# Patient Record
Sex: FEMALE | URBAN - METROPOLITAN AREA
[De-identification: names, ages, dates, MRNs, and addresses within clinical notes are randomized per-mention and may not be internally consistent; named-entity substitution may affect disease eponyms.]

---

## 2019-03-24 ENCOUNTER — EMERGENCY (EMERGENCY)
Facility: HOSPITAL | Age: 69
LOS: 1 days | Discharge: HOME | End: 2019-03-24
Attending: EMERGENCY MEDICINE
Payer: MEDICARE

## 2019-03-24 VITALS
SYSTOLIC BLOOD PRESSURE: 151 MMHG | OXYGEN SATURATION: 98 % | WEIGHT: 175.05 LBS | TEMPERATURE: 98 F | RESPIRATION RATE: 20 BRPM | HEART RATE: 77 BPM | DIASTOLIC BLOOD PRESSURE: 78 MMHG | HEIGHT: 66 IN

## 2019-03-24 VITALS
OXYGEN SATURATION: 98 % | SYSTOLIC BLOOD PRESSURE: 152 MMHG | HEART RATE: 60 BPM | TEMPERATURE: 98 F | DIASTOLIC BLOOD PRESSURE: 84 MMHG | RESPIRATION RATE: 20 BRPM

## 2019-03-24 LAB
ALBUMIN SERPL ELPH-MCNC: 4.4 G/DL — SIGNIFICANT CHANGE UP (ref 3.3–5)
ALP SERPL-CCNC: 90 U/L — SIGNIFICANT CHANGE UP (ref 40–120)
ALT FLD-CCNC: 23 U/L — SIGNIFICANT CHANGE UP (ref 10–45)
ANION GAP SERPL CALC-SCNC: 13 MMOL/L — SIGNIFICANT CHANGE UP (ref 5–17)
AST SERPL-CCNC: 18 U/L — SIGNIFICANT CHANGE UP (ref 10–40)
BASOPHILS # BLD AUTO: 0.1 K/UL — SIGNIFICANT CHANGE UP (ref 0–0.2)
BASOPHILS NFR BLD AUTO: 0.8 % — SIGNIFICANT CHANGE UP (ref 0–2)
BILIRUB SERPL-MCNC: 0.6 MG/DL — SIGNIFICANT CHANGE UP (ref 0.2–1.2)
BUN SERPL-MCNC: 11 MG/DL — SIGNIFICANT CHANGE UP (ref 7–23)
CALCIUM SERPL-MCNC: 9.4 MG/DL — SIGNIFICANT CHANGE UP (ref 8.4–10.5)
CHLORIDE SERPL-SCNC: 104 MMOL/L — SIGNIFICANT CHANGE UP (ref 96–108)
CO2 SERPL-SCNC: 23 MMOL/L — SIGNIFICANT CHANGE UP (ref 22–31)
CREAT SERPL-MCNC: 0.84 MG/DL — SIGNIFICANT CHANGE UP (ref 0.5–1.3)
D DIMER BLD IA.RAPID-MCNC: 155 NG/ML DDU — SIGNIFICANT CHANGE UP
EOSINOPHIL # BLD AUTO: 0.1 K/UL — SIGNIFICANT CHANGE UP (ref 0–0.5)
EOSINOPHIL NFR BLD AUTO: 1.2 % — SIGNIFICANT CHANGE UP (ref 0–6)
GLUCOSE SERPL-MCNC: 114 MG/DL — HIGH (ref 70–99)
HCT VFR BLD CALC: 39.3 % — SIGNIFICANT CHANGE UP (ref 34.5–45)
HGB BLD-MCNC: 13.2 G/DL — SIGNIFICANT CHANGE UP (ref 11.5–15.5)
LYMPHOCYTES # BLD AUTO: 1.6 K/UL — SIGNIFICANT CHANGE UP (ref 1–3.3)
LYMPHOCYTES # BLD AUTO: 14.3 % — SIGNIFICANT CHANGE UP (ref 13–44)
MCHC RBC-ENTMCNC: 30.3 PG — SIGNIFICANT CHANGE UP (ref 27–34)
MCHC RBC-ENTMCNC: 33.5 GM/DL — SIGNIFICANT CHANGE UP (ref 32–36)
MCV RBC AUTO: 90.7 FL — SIGNIFICANT CHANGE UP (ref 80–100)
MONOCYTES # BLD AUTO: 0.8 K/UL — SIGNIFICANT CHANGE UP (ref 0–0.9)
MONOCYTES NFR BLD AUTO: 7.3 % — SIGNIFICANT CHANGE UP (ref 2–14)
NEUTROPHILS # BLD AUTO: 8.6 K/UL — HIGH (ref 1.8–7.4)
NEUTROPHILS NFR BLD AUTO: 76.3 % — SIGNIFICANT CHANGE UP (ref 43–77)
PLATELET # BLD AUTO: 246 K/UL — SIGNIFICANT CHANGE UP (ref 150–400)
POTASSIUM SERPL-MCNC: 4.1 MMOL/L — SIGNIFICANT CHANGE UP (ref 3.5–5.3)
POTASSIUM SERPL-SCNC: 4.1 MMOL/L — SIGNIFICANT CHANGE UP (ref 3.5–5.3)
PROT SERPL-MCNC: 7.3 G/DL — SIGNIFICANT CHANGE UP (ref 6–8.3)
RBC # BLD: 4.34 M/UL — SIGNIFICANT CHANGE UP (ref 3.8–5.2)
RBC # FLD: 12.6 % — SIGNIFICANT CHANGE UP (ref 10.3–14.5)
SODIUM SERPL-SCNC: 140 MMOL/L — SIGNIFICANT CHANGE UP (ref 135–145)
WBC # BLD: 11.3 K/UL — HIGH (ref 3.8–10.5)
WBC # FLD AUTO: 11.3 K/UL — HIGH (ref 3.8–10.5)

## 2019-03-24 PROCEDURE — 85027 COMPLETE CBC AUTOMATED: CPT

## 2019-03-24 PROCEDURE — 96374 THER/PROPH/DIAG INJ IV PUSH: CPT

## 2019-03-24 PROCEDURE — 99284 EMERGENCY DEPT VISIT MOD MDM: CPT | Mod: 25

## 2019-03-24 PROCEDURE — 85379 FIBRIN DEGRADATION QUANT: CPT

## 2019-03-24 PROCEDURE — 99284 EMERGENCY DEPT VISIT MOD MDM: CPT

## 2019-03-24 PROCEDURE — 71046 X-RAY EXAM CHEST 2 VIEWS: CPT | Mod: 26

## 2019-03-24 PROCEDURE — 80053 COMPREHEN METABOLIC PANEL: CPT

## 2019-03-24 PROCEDURE — 71046 X-RAY EXAM CHEST 2 VIEWS: CPT

## 2019-03-24 PROCEDURE — 93005 ELECTROCARDIOGRAM TRACING: CPT

## 2019-03-24 RX ORDER — LIDOCAINE 4 G/100G
1 CREAM TOPICAL ONCE
Refills: 0 | Status: COMPLETED | OUTPATIENT
Start: 2019-03-24 | End: 2019-03-24

## 2019-03-24 RX ORDER — DIAZEPAM 5 MG
2 TABLET ORAL ONCE
Refills: 0 | Status: DISCONTINUED | OUTPATIENT
Start: 2019-03-24 | End: 2019-03-24

## 2019-03-24 RX ORDER — DIAZEPAM 5 MG
1 TABLET ORAL
Qty: 6 | Refills: 0
Start: 2019-03-24 | End: 2019-03-25

## 2019-03-24 RX ORDER — ACETAMINOPHEN 500 MG
650 TABLET ORAL ONCE
Refills: 0 | Status: COMPLETED | OUTPATIENT
Start: 2019-03-24 | End: 2019-03-24

## 2019-03-24 RX ORDER — KETOROLAC TROMETHAMINE 30 MG/ML
15 SYRINGE (ML) INJECTION ONCE
Refills: 0 | Status: DISCONTINUED | OUTPATIENT
Start: 2019-03-24 | End: 2019-03-24

## 2019-03-24 RX ADMIN — LIDOCAINE 1 PATCH: 4 CREAM TOPICAL at 09:17

## 2019-03-24 RX ADMIN — Medication 650 MILLIGRAM(S): at 09:17

## 2019-03-24 RX ADMIN — Medication 15 MILLIGRAM(S): at 12:39

## 2019-03-24 RX ADMIN — Medication 650 MILLIGRAM(S): at 12:39

## 2019-03-24 RX ADMIN — Medication 2 MILLIGRAM(S): at 12:39

## 2019-03-24 NOTE — ED PROVIDER NOTE - NSFOLLOWUPINSTRUCTIONS_ED_ALL_ED_FT
You weer seen for back pain. THe screening test for blood clot was negative. This may be muscular pain. For this you should take motrin 600mg every 6 hours, and you can take valium 1 pill every 8 hours if you need for severe pain. No drinking alcohol or driving while on this medication. This also may be the beginning of shingles. If you see a rash that develops please see a provider for evalution. Return for worsening pain, difficulty breathing, sweating, chest pain, fevers.

## 2019-03-24 NOTE — ED ADULT NURSE NOTE - OBJECTIVE STATEMENT
67 yo F presents to ED A+Ox3 accompanied by  to ED, ambulatory with steady gait c/o L upper back pain. Pt. states the pain began on 3/22/19. Pt. states the pain is worse with movement. Pt. denies falls/trauma to area. Pt. reports SOB "because the pain is worse when I try and take a deep breath." Pt. denies chest pain, fever, chills, abdominal pain. Breathing unlabored on RA, speaking in full sentences without difficulty. Skin warm pink and dry. Family at bedside. Comfort and safety measures in place.

## 2019-03-24 NOTE — ED PROVIDER NOTE - CLINICAL SUMMARY MEDICAL DECISION MAKING FREE TEXT BOX
68 F no sig pmh, with L scpular pleuritic pain wrapping to the side, with possible overlying areas of erythema. Muscular vs ?zoster vs PE given pleuriy. Will check basic labs with d-dimer, cxr, ekg pain control and reassess 68 F no sig pmh, with L scpular pleuritic pain wrapping to the side, with possible overlying areas of erythema. Muscular vs ?zoster vs PE given pleuriy. Will check basic labs with d-dimer, cxr, ekg pain control and reassess  Pt with pain on laft mid back with radiation to front no trauma no fever Pain with breathing cough and moving left arm twisting trunk No known trauma No palpitation Very low risk for DVT/PE no distress no tachycardia SaO2 wnl Trunk no rash Lungs cleat Heart regular s1s2 no murmur Likely MSK pain also check Ddimer CXR  NSAIDs re eval --Lamb

## 2019-03-24 NOTE — ED PROVIDER NOTE - ATTENDING CONTRIBUTION TO CARE
I have seen and evaluated this patient with the resident.   I agree with the findings  unless other wise stated.  I have made appropriate changes in documentations where needed, After my face to face bedside evaluation, I am further  notin F no sig pmh, with L scpular pleuritic pain wrapping to the side, with possible overlying areas of erythema. Muscular vs ?zoster vs PE given pleuriy. Will check basic labs with d-dimer, cxr, ekg pain control and reassess  Pt with pain on laft mid back with radiation to front no trauma no fever Pain with breathing cough and moving left arm twisting trunk No known trauma No palpitation Very low risk for DVT/PE no distress no tachycardia SaO2 wnl Trunk no rash Lungs cleat Heart regular s1s2 no murmur Likely MSK pain also check Ddimer CXR  NSAIDs re eval --Lamb

## 2019-03-24 NOTE — ED PROVIDER NOTE - PROGRESS NOTE DETAILS
d-dimer neg. likely muscular vs ?zoster, will dc with pain meds and isntructions to see a provider if develops rash

## 2019-03-24 NOTE — ED PROVIDER NOTE - OBJECTIVE STATEMENT
68 F no sig pmh, pw L sided rib/back pain. Started 2 days ago at night. Has been taking motrin with little relief. Worse with movement and inspiration. No chest pain. No trauma. No rash. No fever/chills. No pain with urination/dysuria. No recent travel/smoking/leg pain or swelling/h/o blood clot.

## 2019-03-28 ENCOUNTER — APPOINTMENT (OUTPATIENT)
Dept: ORTHOPEDIC SURGERY | Facility: CLINIC | Age: 69
End: 2019-03-28
Payer: MEDICARE

## 2019-03-28 VITALS
SYSTOLIC BLOOD PRESSURE: 134 MMHG | WEIGHT: 172 LBS | BODY MASS INDEX: 27.76 KG/M2 | HEART RATE: 66 BPM | DIASTOLIC BLOOD PRESSURE: 77 MMHG

## 2019-03-28 VITALS — HEIGHT: 66 IN

## 2019-03-28 DIAGNOSIS — Z78.9 OTHER SPECIFIED HEALTH STATUS: ICD-10-CM

## 2019-03-28 DIAGNOSIS — Z87.891 PERSONAL HISTORY OF NICOTINE DEPENDENCE: ICD-10-CM

## 2019-03-28 DIAGNOSIS — M47.816 SPONDYLOSIS W/OUT MYELOPATHY OR RADICULOPATHY, LUMBAR REGION: ICD-10-CM

## 2019-03-28 DIAGNOSIS — M25.552 PAIN IN LEFT HIP: ICD-10-CM

## 2019-03-28 PROBLEM — Z00.00 ENCOUNTER FOR PREVENTIVE HEALTH EXAMINATION: Status: ACTIVE | Noted: 2019-03-28

## 2019-03-28 PROCEDURE — 99204 OFFICE O/P NEW MOD 45 MIN: CPT

## 2019-03-28 PROCEDURE — 72100 X-RAY EXAM L-S SPINE 2/3 VWS: CPT

## 2019-03-28 PROCEDURE — 73502 X-RAY EXAM HIP UNI 2-3 VIEWS: CPT | Mod: LT

## 2019-03-29 PROBLEM — Z78.9 DOES NOT USE ILLICIT DRUGS: Status: ACTIVE | Noted: 2019-03-28

## 2019-03-29 PROBLEM — Z78.9 NO PERTINENT PAST MEDICAL HISTORY: Status: RESOLVED | Noted: 2019-03-28 | Resolved: 2019-03-29

## 2019-03-29 PROBLEM — Z78.9 NO PERTINENT PAST SURGICAL HISTORY: Status: RESOLVED | Noted: 2019-03-28 | Resolved: 2019-03-29

## 2019-03-29 PROBLEM — Z78.9 EXERCISES OCCASIONALLY: Status: ACTIVE | Noted: 2019-03-28

## 2019-03-29 PROBLEM — Z87.891 FORMER SMOKER: Status: ACTIVE | Noted: 2019-03-28

## 2019-03-29 PROBLEM — M25.552 LEFT HIP PAIN: Status: ACTIVE | Noted: 2019-03-29

## 2019-03-29 RX ORDER — NEOMYCIN SULFATE, POLYMYXIN B SULFATE, HYDROCORTISONE 3.5; 10000; 1 MG/ML; [USP'U]/ML; MG/ML
1 SOLUTION/ DROPS AURICULAR (OTIC)
Qty: 10 | Refills: 0 | Status: ACTIVE | COMMUNITY
Start: 2018-12-26

## 2019-03-29 RX ORDER — AMOXICILLIN AND CLAVULANATE POTASSIUM 875; 125 MG/1; MG/1
875-125 TABLET, COATED ORAL
Qty: 20 | Refills: 0 | Status: ACTIVE | COMMUNITY
Start: 2019-01-09

## 2019-03-29 RX ORDER — FLUTICASONE PROPIONATE 50 UG/1
50 SPRAY, METERED NASAL
Qty: 16 | Refills: 0 | Status: ACTIVE | COMMUNITY
Start: 2019-01-09

## 2019-03-29 RX ORDER — TIZANIDINE 4 MG/1
4 TABLET ORAL
Qty: 90 | Refills: 0 | Status: ACTIVE | COMMUNITY
Start: 2018-02-06

## 2019-03-29 RX ORDER — PREDNISONE 10 MG/1
10 TABLET ORAL
Qty: 7 | Refills: 0 | Status: ACTIVE | COMMUNITY
Start: 2019-03-15

## 2019-03-29 RX ORDER — ESOMEPRAZOLE MAGNESIUM 40 MG/1
40 CAPSULE, DELAYED RELEASE ORAL
Qty: 90 | Refills: 0 | Status: ACTIVE | COMMUNITY
Start: 2018-05-23

## 2019-03-29 RX ORDER — HYDROCORTISONE 25 MG/G
2.5 CREAM TOPICAL
Qty: 56 | Refills: 0 | Status: ACTIVE | COMMUNITY
Start: 2018-10-31

## 2019-03-29 RX ORDER — SUMATRIPTAN 100 MG/1
100 TABLET, FILM COATED ORAL
Qty: 27 | Refills: 0 | Status: ACTIVE | COMMUNITY
Start: 2018-10-23

## 2019-03-29 RX ORDER — CLOTRIMAZOLE AND BETAMETHASONE DIPROPIONATE 10; .5 MG/G; MG/G
1-0.05 CREAM TOPICAL
Qty: 45 | Refills: 0 | Status: ACTIVE | COMMUNITY
Start: 2018-12-04

## 2019-03-29 RX ORDER — TOPIRAMATE 25 MG/1
25 TABLET, FILM COATED ORAL
Qty: 180 | Refills: 0 | Status: ACTIVE | COMMUNITY
Start: 2017-11-03

## 2019-03-29 RX ORDER — DIAZEPAM 2 MG/1
2 TABLET ORAL
Qty: 6 | Refills: 0 | Status: ACTIVE | COMMUNITY
Start: 2019-03-24

## 2019-03-29 RX ORDER — TOBRAMYCIN AND DEXAMETHASONE 3; 1 MG/ML; MG/ML
0.3-0.1 SUSPENSION/ DROPS OPHTHALMIC
Qty: 5 | Refills: 0 | Status: ACTIVE | COMMUNITY
Start: 2018-10-15

## 2019-03-29 RX ORDER — DIVALPROEX SODIUM 500 1/1
500 TABLET, EXTENDED RELEASE ORAL
Qty: 180 | Refills: 0 | Status: ACTIVE | COMMUNITY
Start: 2018-10-23

## 2019-05-21 ENCOUNTER — RX RENEWAL (OUTPATIENT)
Age: 69
End: 2019-05-21

## 2019-07-16 ENCOUNTER — RX RENEWAL (OUTPATIENT)
Age: 69
End: 2019-07-16

## 2019-12-12 ENCOUNTER — RX RENEWAL (OUTPATIENT)
Age: 69
End: 2019-12-12

## 2019-12-12 RX ORDER — MELOXICAM 15 MG/1
15 TABLET ORAL DAILY
Qty: 30 | Refills: 1 | Status: ACTIVE | COMMUNITY
Start: 2019-03-28 | End: 1900-01-01

## 2020-05-05 PROBLEM — Z00.00 ENCOUNTER FOR PREVENTIVE HEALTH EXAMINATION: Status: ACTIVE | Noted: 2020-05-05

## 2021-01-14 ENCOUNTER — EMERGENCY (EMERGENCY)
Facility: HOSPITAL | Age: 71
LOS: 1 days | Discharge: HOME | End: 2021-01-14
Attending: EMERGENCY MEDICINE
Payer: MEDICARE

## 2021-01-14 VITALS
SYSTOLIC BLOOD PRESSURE: 117 MMHG | TEMPERATURE: 98 F | OXYGEN SATURATION: 96 % | RESPIRATION RATE: 18 BRPM | DIASTOLIC BLOOD PRESSURE: 75 MMHG | HEART RATE: 56 BPM

## 2021-01-14 VITALS
WEIGHT: 149.91 LBS | TEMPERATURE: 98 F | OXYGEN SATURATION: 97 % | SYSTOLIC BLOOD PRESSURE: 128 MMHG | HEART RATE: 73 BPM | HEIGHT: 66 IN | DIASTOLIC BLOOD PRESSURE: 56 MMHG | RESPIRATION RATE: 17 BRPM

## 2021-01-14 LAB
ALBUMIN SERPL ELPH-MCNC: 4.1 G/DL — SIGNIFICANT CHANGE UP (ref 3.3–5)
ALP SERPL-CCNC: 85 U/L — SIGNIFICANT CHANGE UP (ref 40–120)
ALT FLD-CCNC: 9 U/L — LOW (ref 10–45)
ANION GAP SERPL CALC-SCNC: 10 MMOL/L — SIGNIFICANT CHANGE UP (ref 5–17)
AST SERPL-CCNC: 16 U/L — SIGNIFICANT CHANGE UP (ref 10–40)
BASOPHILS # BLD AUTO: 0.07 K/UL — SIGNIFICANT CHANGE UP (ref 0–0.2)
BASOPHILS NFR BLD AUTO: 0.8 % — SIGNIFICANT CHANGE UP (ref 0–2)
BILIRUB SERPL-MCNC: 0.4 MG/DL — SIGNIFICANT CHANGE UP (ref 0.2–1.2)
BUN SERPL-MCNC: 14 MG/DL — SIGNIFICANT CHANGE UP (ref 7–23)
CALCIUM SERPL-MCNC: 9 MG/DL — SIGNIFICANT CHANGE UP (ref 8.4–10.5)
CHLORIDE SERPL-SCNC: 104 MMOL/L — SIGNIFICANT CHANGE UP (ref 96–108)
CO2 SERPL-SCNC: 28 MMOL/L — SIGNIFICANT CHANGE UP (ref 22–31)
CREAT SERPL-MCNC: 0.68 MG/DL — SIGNIFICANT CHANGE UP (ref 0.5–1.3)
EOSINOPHIL # BLD AUTO: 0.12 K/UL — SIGNIFICANT CHANGE UP (ref 0–0.5)
EOSINOPHIL NFR BLD AUTO: 1.4 % — SIGNIFICANT CHANGE UP (ref 0–6)
GLUCOSE SERPL-MCNC: 84 MG/DL — SIGNIFICANT CHANGE UP (ref 70–99)
HCT VFR BLD CALC: 36.3 % — SIGNIFICANT CHANGE UP (ref 34.5–45)
HGB BLD-MCNC: 11.8 G/DL — SIGNIFICANT CHANGE UP (ref 11.5–15.5)
IMM GRANULOCYTES NFR BLD AUTO: 0.2 % — SIGNIFICANT CHANGE UP (ref 0–1.5)
LYMPHOCYTES # BLD AUTO: 2.22 K/UL — SIGNIFICANT CHANGE UP (ref 1–3.3)
LYMPHOCYTES # BLD AUTO: 26 % — SIGNIFICANT CHANGE UP (ref 13–44)
MCHC RBC-ENTMCNC: 27.7 PG — SIGNIFICANT CHANGE UP (ref 27–34)
MCHC RBC-ENTMCNC: 32.5 GM/DL — SIGNIFICANT CHANGE UP (ref 32–36)
MCV RBC AUTO: 85.2 FL — SIGNIFICANT CHANGE UP (ref 80–100)
MONOCYTES # BLD AUTO: 0.53 K/UL — SIGNIFICANT CHANGE UP (ref 0–0.9)
MONOCYTES NFR BLD AUTO: 6.2 % — SIGNIFICANT CHANGE UP (ref 2–14)
NEUTROPHILS # BLD AUTO: 5.57 K/UL — SIGNIFICANT CHANGE UP (ref 1.8–7.4)
NEUTROPHILS NFR BLD AUTO: 65.4 % — SIGNIFICANT CHANGE UP (ref 43–77)
NRBC # BLD: 0 /100 WBCS — SIGNIFICANT CHANGE UP (ref 0–0)
NT-PROBNP SERPL-SCNC: 140 PG/ML — SIGNIFICANT CHANGE UP (ref 0–300)
PLATELET # BLD AUTO: 206 K/UL — SIGNIFICANT CHANGE UP (ref 150–400)
POTASSIUM SERPL-MCNC: 4 MMOL/L — SIGNIFICANT CHANGE UP (ref 3.5–5.3)
POTASSIUM SERPL-SCNC: 4 MMOL/L — SIGNIFICANT CHANGE UP (ref 3.5–5.3)
PROT SERPL-MCNC: 7.2 G/DL — SIGNIFICANT CHANGE UP (ref 6–8.3)
RBC # BLD: 4.26 M/UL — SIGNIFICANT CHANGE UP (ref 3.8–5.2)
RBC # FLD: 14.6 % — HIGH (ref 10.3–14.5)
SODIUM SERPL-SCNC: 142 MMOL/L — SIGNIFICANT CHANGE UP (ref 135–145)
TROPONIN T, HIGH SENSITIVITY RESULT: <6 NG/L — SIGNIFICANT CHANGE UP (ref 0–51)
WBC # BLD: 8.53 K/UL — SIGNIFICANT CHANGE UP (ref 3.8–10.5)
WBC # FLD AUTO: 8.53 K/UL — SIGNIFICANT CHANGE UP (ref 3.8–10.5)

## 2021-01-14 PROCEDURE — 70450 CT HEAD/BRAIN W/O DYE: CPT | Mod: 26

## 2021-01-14 PROCEDURE — 80053 COMPREHEN METABOLIC PANEL: CPT

## 2021-01-14 PROCEDURE — 83880 ASSAY OF NATRIURETIC PEPTIDE: CPT

## 2021-01-14 PROCEDURE — 84484 ASSAY OF TROPONIN QUANT: CPT

## 2021-01-14 PROCEDURE — 93005 ELECTROCARDIOGRAM TRACING: CPT

## 2021-01-14 PROCEDURE — 85025 COMPLETE CBC W/AUTO DIFF WBC: CPT

## 2021-01-14 PROCEDURE — 99284 EMERGENCY DEPT VISIT MOD MDM: CPT | Mod: 25

## 2021-01-14 PROCEDURE — 93010 ELECTROCARDIOGRAM REPORT: CPT

## 2021-01-14 PROCEDURE — 70450 CT HEAD/BRAIN W/O DYE: CPT

## 2021-01-14 PROCEDURE — 99285 EMERGENCY DEPT VISIT HI MDM: CPT

## 2021-01-14 PROCEDURE — 71046 X-RAY EXAM CHEST 2 VIEWS: CPT | Mod: 26

## 2021-01-14 PROCEDURE — 71046 X-RAY EXAM CHEST 2 VIEWS: CPT

## 2021-01-14 RX ORDER — SODIUM CHLORIDE 9 MG/ML
1000 INJECTION, SOLUTION INTRAVENOUS ONCE
Refills: 0 | Status: COMPLETED | OUTPATIENT
Start: 2021-01-14 | End: 2021-01-14

## 2021-01-14 RX ORDER — ACETAMINOPHEN 500 MG
975 TABLET ORAL ONCE
Refills: 0 | Status: COMPLETED | OUTPATIENT
Start: 2021-01-14 | End: 2021-01-14

## 2021-01-14 RX ADMIN — SODIUM CHLORIDE 2000 MILLILITER(S): 9 INJECTION, SOLUTION INTRAVENOUS at 20:44

## 2021-01-14 RX ADMIN — Medication 975 MILLIGRAM(S): at 20:44

## 2021-01-14 NOTE — ED ADULT NURSE NOTE - OBJECTIVE STATEMENT
70y female presents to the ED via triage, complaining of syncope. AOx4; endorses today at [facility] having a syncopal fall. States she was looking for her license got nervous because she couldn't find it; and saw her vision "go black". Fell and struck forehead on floor (hematoma to L forehead). + LOC. Was sent in by PCP. Denies dizziness before and after fall. Denies SOB, chest pain, headache, N/V/D, blurry vision, dizziness. No use of blood thinners. Gross neuro status intact, ambulatory w/ steady gait.

## 2021-01-14 NOTE — ED ADULT NURSE NOTE - NSIMPLEMENTINTERV_GEN_ALL_ED
Implemented All Fall Risk Interventions:  [place] to call system. Call bell, personal items and telephone within reach. Instruct patient to call for assistance. Room bathroom lighting operational. Non-slip footwear when patient is off stretcher. Physically safe environment: no spills, clutter or unnecessary equipment. Stretcher in lowest position, wheels locked, appropriate side rails in place. Provide visual cue, wrist band, yellow gown, etc. Monitor gait and stability. Monitor for mental status changes and reorient to person, place, and time. Review medications for side effects contributing to fall risk. Reinforce activity limits and safety measures with patient and family.

## 2021-01-14 NOTE — ED PROVIDER NOTE - ATTENDING CONTRIBUTION TO CARE
------------ATTENDING NOTE------------   pt c/o passing out several hours ago, describes shopping and feeling hot in sweater/winter coat, was at check out paying and had trouble finding her credit card, felt panicked and began hyperventilating, felt lightheaded, had gradual onset tunnel vision, sudden LOC and slumped to ground, hit L forehead on ground, awoke back to baseline < min, no current complaints apart felt thirsty and mild ache in L forehead, never chest pain/discomfort or sob/dyspnea, no numbness/weakness, c/w dehydration/situational syncope, normal neuro exam, CTL spine clinically clear, benign stable chest/abd, awaiting labs/imaging and close reassessments -->  - Doni Helton MD    --------------------------------------------- ------------ATTENDING NOTE------------   pt c/o passing out several hours ago, describes shopping and feeling hot in sweater/winter coat, was at check out paying and had trouble finding her credit card, felt panicked and began hyperventilating, felt lightheaded, had gradual onset tunnel vision, sudden LOC and slumped to ground, hit L forehead on ground, awoke back to baseline < min, no current complaints apart felt thirsty and mild ache in L forehead, never chest pain/discomfort or sob/dyspnea, no numbness/weakness, c/w dehydration/situational syncope, normal neuro exam, CTL spine clinically clear, benign stable chest/abd, awaiting labs/imaging and close reassessments --> labs/VS wnl, tolerating PO, asymptomatic at dc, in depth dw pt about ddx, tx, [place], continued close outpt fu.  - Doni Helton MD    ---------------------------------------------

## 2021-01-14 NOTE — ED PROVIDER NOTE - NS ED ROS FT
General: no fever, no chills  Eyes: no vision changes, no eye pain  Cardiovascular: no chest pain, no edema  Respiratory: no cough, no shortness of breath  Gastrointestinal: no abdominal pain, no nausea, no vomiting, no diarrhea  Genitourinary: no dysuria, no hematuria  Musculoskeletal: no muscle pain, no joint pain  Skin: no rash, no lesions  Neuro: no numbness, no tingling, +syncopal episode  Psych: no depression, no anxiety

## 2021-01-14 NOTE — ED PROVIDER NOTE - PHYSICAL EXAMINATION
General: well appearing, no acute distress, AOx3  Skin: normal color for race, no rash  Head: are of ecchymosis over left forehead, no evidence of laceration, nontender to palpation over frontal, maxillary, or mandibular region  Eyes: clear conjunctiva, EOMI  ENMT: airway patent, no nasal discharge, no evidence of tongue laceration, oral mucosa moist   Cardiovascular: normal rate, normal rhythm, S1/S2  Pulmonary: clear to auscultation bilaterally, no rales, rhonchi, or wheeze  Abdomen: soft, nontender  Musculoskeletal: moving extremities well, no deformity, 5/5 strength b/l, normal gait   Psych: normal mood, normal affect
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
## 2021-01-14 NOTE — ED PROVIDER NOTE - OBJECTIVE STATEMENT
70 year old female with a pmhx of diverticulosis and migraines, presents to ED for evaluation of syncopal episode that occurred earlier today. Patient reports being in a store with her winter coat and mask on and looking for her license in her bag. She didn't see it and began to get nervous searching for it in the bag and then collapsed to the ground. Patient hit her forehead and immediately came to. She was assisted up by 2 bystanders. Patient states she currently feels well and was sent by her PCP. Patient denies any dizziness or chest pain before or after the episode. She denies any headache, blurry vision, weakness, neck pain, chest pain, sob, cough, abd pain, vomiting, diarrhea, dysuria. Patient was not on any blood thinners or aspirin. Never had this happen before.

## 2021-01-14 NOTE — ED PROVIDER NOTE - PROGRESS NOTE DETAILS
Thomas Marc, PGY-1- Discussed results of work up with patient. Patient agrees with plan to discharge home. All questions answered regarding plan. Strict return precautions given. Patient reports having bradycardia in the past. Currently asymptomatic. Advised to follow up with PCP.

## 2021-01-14 NOTE — ED PROVIDER NOTE - PATIENT PORTAL LINK FT
You can access the FollowMyHealth Patient Portal offered by [facility] by registering at the following website: [email or web address]. By joining [employer]’s FollowMyHealth portal, you will also be able to view your health information using other applications (apps) compatible with our system.
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
## 2021-03-29 NOTE — ED ADULT NURSE NOTE - NSFALLRSKASSESSDT_ED_ALL_ED
Please schedule an appointment with Interventional Radiology for a tube check in 4-6 weeks. You may call [phone] to schedule your appointment.  14-Jan-2021 22:11

## 2023-08-04 NOTE — ED PROVIDER NOTE - TEMPLATE, MLM
Pt saw [facility] and they want her to have and was approved for the [facility] wound vac. Is this okay with the plan of treatment Dr. Daly Camarena has?  Please call and advise [phone] General

## 2024-03-05 NOTE — ED ADULT TRIAGE NOTE - NS ED TRIAGE PATIENT KNOWNTIME
----- Message from Seema Vargas PA-C sent at 3/4/2024  9:28 PM EST -----  Please schedule with AP, Thank you!  ----- Message -----  From: Stacy Mario  Sent: 3/4/2024   4:31 PM EST  To: [facility]    Hello, pt needs f/u scheduled and also repeat egd in 3-6 months. thanks  ----- Message -----  From: Loco Mcqueen MD  Sent: 3/4/2024   2:58 PM EST  To: #    Please let her know biopsy from esophagus is negative for Mclaughlin's esophagus.  She had a mild gastritis.  Biopsy from the polyp in the stomach was benign.  Her colon and small biopsies were also unremarkable.  She should follow-up in the office with one of the advanced practitioner and I recommend repeat EGD in 3 to 6 months.  This can be scheduled during her office visit.       Known

## 2024-05-07 ENCOUNTER — EMERGENCY (EMERGENCY)
Facility: HOSPITAL | Age: 74
LOS: 1 days | Discharge: HOME | End: 2024-05-07
Attending: EMERGENCY MEDICINE
Payer: COMMERCIAL

## 2024-05-07 VITALS
DIASTOLIC BLOOD PRESSURE: 60 MMHG | OXYGEN SATURATION: 95 % | RESPIRATION RATE: 17 BRPM | HEART RATE: 63 BPM | HEIGHT: 67 IN | SYSTOLIC BLOOD PRESSURE: 137 MMHG | TEMPERATURE: 99 F | WEIGHT: 130.07 LBS

## 2024-05-07 VITALS
OXYGEN SATURATION: 98 % | TEMPERATURE: 99 F | SYSTOLIC BLOOD PRESSURE: 145 MMHG | DIASTOLIC BLOOD PRESSURE: 78 MMHG | HEART RATE: 62 BPM | RESPIRATION RATE: 19 BRPM

## 2024-05-07 PROCEDURE — 70450 CT HEAD/BRAIN W/O DYE: CPT | Mod: MC

## 2024-05-07 PROCEDURE — 99284 EMERGENCY DEPT VISIT MOD MDM: CPT

## 2024-05-07 PROCEDURE — 71045 X-RAY EXAM CHEST 1 VIEW: CPT

## 2024-05-07 PROCEDURE — 70450 CT HEAD/BRAIN W/O DYE: CPT | Mod: 26,MC

## 2024-05-07 PROCEDURE — 72170 X-RAY EXAM OF PELVIS: CPT | Mod: 26

## 2024-05-07 PROCEDURE — 99284 EMERGENCY DEPT VISIT MOD MDM: CPT | Mod: 25

## 2024-05-07 PROCEDURE — 72170 X-RAY EXAM OF PELVIS: CPT

## 2024-05-07 PROCEDURE — 71045 X-RAY EXAM CHEST 1 VIEW: CPT | Mod: 26
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
## 2024-05-07 NOTE — ED PROVIDER NOTE - PATIENT PORTAL LINK FT
You can access the FollowMyHealth Patient Portal offered by [facility] by registering at the following website: [email or web address]. By joining [employer]’s FollowMyHealth portal, you will also be able to view your health information using other applications (apps) compatible with our system.

## 2024-05-07 NOTE — ED PROVIDER NOTE - MUSCULOSKELETAL, MLM
Spine appears normal no midline ttp/deformities, range of motion is not limited, no muscle or joint tenderness. Steady gait
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
## 2024-05-07 NOTE — ED PROVIDER NOTE - NSFOLLOWUPINSTRUCTIONS_ED_ALL_ED_FT
Follow up with your Primary Care Physician within the next 3 days    *Bring all printed lab/test results to your appointment(s).*    Take acetaminophen 500-1000mg every 6 hrs as needed for pain. DO NOT EXCEED 4000mg DAILY.    Return to the Emergency Room if you experience new or worsening symptoms
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
## 2024-05-07 NOTE — ED ADULT NURSE REASSESSMENT NOTE - NS ED NURSE REASSESS COMMENT FT1
Received report from Isadora HOWARD RN. Pt is awake, alert, and speaking in full coherent sentences. NAD noted. Pt is resting comfortably in stretcher, side rails up and bed in lowest position. Comfort and safety provided. Pt is pending dispo.

## 2024-05-07 NOTE — ED PROVIDER NOTE - NSICDXPASTMEDICALHX_GEN_ALL_CORE_FT
CV Surgery    Epicardial Wires removed intact per protocol , Sinus rhythm maintained.     Annita Lovett, APNP     PAST MEDICAL HISTORY:  No pertinent past medical history

## 2024-05-08 PROBLEM — Z78.9 OTHER SPECIFIED HEALTH STATUS: Chronic | Status: ACTIVE | Noted: 2019-03-24
